# Patient Record
Sex: FEMALE | URBAN - METROPOLITAN AREA
[De-identification: names, ages, dates, MRNs, and addresses within clinical notes are randomized per-mention and may not be internally consistent; named-entity substitution may affect disease eponyms.]

---

## 2019-06-27 ENCOUNTER — APPOINTMENT (RX ONLY)
Dept: URBAN - METROPOLITAN AREA CLINIC 34 | Facility: CLINIC | Age: 39
Setting detail: DERMATOLOGY
End: 2019-06-27

## 2019-06-27 VITALS
DIASTOLIC BLOOD PRESSURE: 93 MMHG | WEIGHT: 233 LBS | SYSTOLIC BLOOD PRESSURE: 144 MMHG | HEIGHT: 60 IN | HEART RATE: 76 BPM

## 2019-06-27 DIAGNOSIS — L74.51 PRIMARY FOCAL HYPERHIDROSIS: ICD-10-CM

## 2019-06-27 DIAGNOSIS — R03.0 ELEVATED BLOOD-PRESSURE READING, WITHOUT DIAGNOSIS OF HYPERTENSION: ICD-10-CM

## 2019-06-27 PROBLEM — D23.71 OTHER BENIGN NEOPLASM OF SKIN OF RIGHT LOWER LIMB, INCLUDING HIP: Status: ACTIVE | Noted: 2019-06-27

## 2019-06-27 PROBLEM — L74.510 PRIMARY FOCAL HYPERHIDROSIS, AXILLA: Status: ACTIVE | Noted: 2019-06-27

## 2019-06-27 PROBLEM — F41.9 ANXIETY DISORDER, UNSPECIFIED: Status: ACTIVE | Noted: 2019-06-27

## 2019-06-27 PROCEDURE — ? PLAN FOR BMI MANAGEMENT

## 2019-06-27 PROCEDURE — ? PRESCRIPTION

## 2019-06-27 PROCEDURE — 99202 OFFICE O/P NEW SF 15 MIN: CPT

## 2019-06-27 PROCEDURE — ? OBSERVATION AND MEASURE

## 2019-06-27 PROCEDURE — ? COUNSELING

## 2019-06-27 RX ORDER — GLYCOPYRRONIUM 2.4 G/100G
1 CLOTH CLOTH TOPICAL EVERY 24 HOURS
Qty: 1 | Refills: 0 | Status: CANCELLED

## 2019-06-27 ASSESSMENT — LOCATION SIMPLE DESCRIPTION DERM
LOCATION SIMPLE: LEFT AXILLARY VAULT
LOCATION SIMPLE: RIGHT AXILLARY VAULT

## 2019-06-27 ASSESSMENT — LOCATION DETAILED DESCRIPTION DERM
LOCATION DETAILED: RIGHT AXILLARY VAULT
LOCATION DETAILED: LEFT AXILLARY VAULT

## 2019-06-27 ASSESSMENT — LOCATION ZONE DERM: LOCATION ZONE: AXILLAE

## 2019-06-27 NOTE — HPI: SKIN LESION
Is This A New Presentation, Or A Follow-Up?: Skin Lesion
What Type Of Note Output Would You Prefer (Optional)?: Bullet Format
Has Your Skin Lesion Been Treated?: not been treated
Which Family Member (Optional)?: Grandmother

## 2019-06-27 NOTE — PROCEDURE: COUNSELING
Patient Specific Counseling (Will Not Stick From Patient To Patient): Patient is very wet throughout the clothing and armpits on the skin.  She has tried over the counter Certain Dri and other Super strength antiperspirant and she erupts \"terribly\" on the skin and it becomes sensitive Drysol would not be a good option because of this- likely more problems, due to her history than benefit. She is interested in Qbrexxa.  Samples provided to her and reviewed appropriate use.  She is aware insurance may not cover and we may have to get through another company other than private insurance.  Advised of risks and potential adverse effects.  \\n\\nShe notes her hands and feet are not as bad as the armpits - which have been very involved since High School.  Referred her to also read online at [email or web address]  She will use samples and return in 2 weeks and we will review again - and s ee if she wishes to have script.  \\n\\nHyperhidrosis is the name given to excessive sweating.\\nHyperhidrosis may affect the entire body, or it may be localised to the armpits, palms, soles, face or elsewhere. It usually involves both sides of the body. The exact cause of hyperhidrosis is unknown. There are numerous sweat glands distributed over the body. The amount of sweat produced is regulated by a body temperature centre in the brain. An increase in air temperature, exercise, fever, anxiety, or spicy food may set off attacks of sweating. The sweating usually reduces at night time and disappears during sleep. Sweaty feet develop an unpleasant smell, ruin footwear and are prone to dermatitis and infection.\\n\\nTreatment:\\nWear loose fitting garments made of fabric that does not stain\\nExpect to change your socks several times a day. \\nAbsorbent insoles in shoes are useful, but may need replacing frequently. Don't wear the same pair of shoes two days in a row.\\nFrequent washing using a deodorizing soap can reduce the smell.\\nApply Drysol after a cool showed just before bed.\\nWash off in the morning. \\nApply the Drysol nightly for the first week.\\nAfter the first week apply 1-2 times a week.\\nTalcum powder and corn starch powder are suitable for mild cases.\\nApply the powder between the toes, under the breasts and skin folds.\\n\\nIf these treatments fail other options include:\\nAnticholinergic drugs, and Beta blockers.\\nIontophoresis is a device that can be purchased. A low electrical current is passed across the skin surface daily for several weeks and stops sweat production.\\nBotulinum toxin injections into the armpits can reduce or even stop sweating for three to six months. Botulinum toxin is also effective for the palms and soles. \\nOveractive sweat glands may also be removed by tumescent liposuction or subcutaneous curettage.\\nSurgery to remove the sweat gland-bearing skin of the armpits is a major procedure usually requiring skin grafts. \\nChemical or surgical sympathectomy. \\n\\nTreatment available for Hyperhydrosis \\n\\nBeta blockers is a medication used for heart disease and hypertension. It has been shown to be helpful in hyperhidrosis.\\n\\nIontophoresis is available at some hospital departments for hyperhidrosis of palms, soles and armpits. A low electrical current is passed across the skin surface daily for several weeks and stops sweat production. Visits are then required weekly or less often. Iontophoresis may cause irritation or dermatitis.\\n\\nBotulinum toxin injections in the armpits are increasingly popular  injections into the armpits are increasingly popular as they can reduce or even stop sweating for three to six months. Botulinum toxin is also effective for the palms and soles. Botulinum toxin may temporarily weaken the small muscles of the hands. Regional or local anesthetics is desirable in these sites as the injections are painful.\\n\\nSurgical excision of overactive sweat glands may also be removed by tumescent liposuction  or subcutaneous curettage. Surgery to remove the sweat gland-bearing skin of the armpits is a major procedure usually requiring skin grafts.\\n\\nChemical or surgical sympathectomy are operations on the spinal sympathetic nerves, which considerably reduce sweating of the armpits and the palms. Unfortunately the effect may not be permanent (recurring in up to 15% of cases), and is accompanied by undesirable skin warmth and dryness. There is a low risk of serious complications. Sympathectomy is therefore reserved for the most severely affected individuals. \\n
Medical Necessity Clause: Botulinum toxin hyperhidrosis therapy is medically necessary because
Detail Level: Detailed
Detail Level: Simple
Medical Necessity Information: LCD Guidelines vary from payer to payer. Please check with your payer's policy to determine medical necessity.
Patient Specific Counseling (Will Not Stick From Patient To Patient): Features support Dermatofibroma . Discussed removal as it is tender for her- and advised she talk with [facility] - for quote as she has not yet met her deductible She is aware of pathology costs separate from the procedural costs and billed by another company . She voiced understanding and is also aware that if something peculiar was found on pathology that costs could increase.  Do not expect that by exam today . \\n\\nIf she wishes to have removal it would be a surgical appointment with .
Size Of Lesion: 0.7 cm
Quality 317: Preventative Care And Screening: Screening For High Blood Pressure And Follow-Up Documented: Pre-hypertensive or hypertensive blood pressure reading documented, and the indicated follow-up is documented

## 2019-08-13 ENCOUNTER — APPOINTMENT (RX ONLY)
Dept: URBAN - METROPOLITAN AREA CLINIC 34 | Facility: CLINIC | Age: 39
Setting detail: DERMATOLOGY
End: 2019-08-13

## 2019-08-13 VITALS
WEIGHT: 225 LBS | HEART RATE: 77 BPM | DIASTOLIC BLOOD PRESSURE: 93 MMHG | HEIGHT: 60 IN | SYSTOLIC BLOOD PRESSURE: 138 MMHG

## 2019-08-13 DIAGNOSIS — R03.0 ELEVATED BLOOD-PRESSURE READING, WITHOUT DIAGNOSIS OF HYPERTENSION: ICD-10-CM

## 2019-08-13 DIAGNOSIS — L74.51 PRIMARY FOCAL HYPERHIDROSIS: ICD-10-CM

## 2019-08-13 PROBLEM — L74.510 PRIMARY FOCAL HYPERHIDROSIS, AXILLA: Status: ACTIVE | Noted: 2019-08-13

## 2019-08-13 PROCEDURE — ? COUNSELING

## 2019-08-13 PROCEDURE — ? PLAN FOR BMI MANAGEMENT

## 2019-08-13 PROCEDURE — ? PRESCRIPTION

## 2019-08-13 PROCEDURE — 99213 OFFICE O/P EST LOW 20 MIN: CPT

## 2019-08-13 RX ORDER — GLYCOPYRRONIUM 2.4 G/100G
1 TOWEL CLOTH TOPICAL EVERY 24 HOURS
Qty: 90 | Refills: 1 | Status: ERX | COMMUNITY
Start: 2019-08-13

## 2019-08-13 RX ADMIN — GLYCOPYRRONIUM 1 TOWEL: 2.4 CLOTH TOPICAL at 16:25

## 2019-08-13 ASSESSMENT — LOCATION SIMPLE DESCRIPTION DERM
LOCATION SIMPLE: RIGHT AXILLARY VAULT
LOCATION SIMPLE: LEFT AXILLARY VAULT

## 2019-08-13 ASSESSMENT — LOCATION DETAILED DESCRIPTION DERM
LOCATION DETAILED: LEFT AXILLARY VAULT
LOCATION DETAILED: RIGHT AXILLARY VAULT

## 2019-08-13 ASSESSMENT — LOCATION ZONE DERM: LOCATION ZONE: AXILLAE

## 2019-08-13 NOTE — PROCEDURE: COUNSELING
Medical Necessity Clause: Botulinum toxin hyperhidrosis therapy is medically necessary because
Detail Level: Detailed
Patient Specific Counseling (Will Not Stick From Patient To Patient): Patient has been using Qbrexa samples and found that she can cut them in 1/2 and get 2 doses out of them and it manages things well for her . She has been out now about a week- from the samples and states that she has not noted any adverse effects on review- no irritation or dryness.  She has tried all the aluminum and deoderant/antiperspirant products on the market . The higher up in strength she went with those the more irritation and trauma to the skin .Certain Dri was not doable as it really inflamed her skin causing much irritation and discomfort. Qbrexxa is not doing that for her and she does like how it works.\\n\\nReviewed measures in managing sweat and odor control We talked about script and will send to [facility]- and if concerns she is to call the clinic.  Coupon card given to patient she can activate and notify [facility]- of that whens he does.  \\n\\nReturn to clinic in 6 months or earlier if concerns. Have to see at least annually after that to maintain script .
Medical Necessity Information: LCD Guidelines vary from payer to payer. Please check with your payer's policy to determine medical necessity.
Quality 317: Preventative Care And Screening: Screening For High Blood Pressure And Follow-Up Documented: Pre-hypertensive or hypertensive blood pressure reading documented, and the indicated follow-up is documented

## 2019-09-26 ENCOUNTER — APPOINTMENT (RX ONLY)
Dept: URBAN - METROPOLITAN AREA CLINIC 34 | Facility: CLINIC | Age: 39
Setting detail: DERMATOLOGY
End: 2019-09-26

## 2019-09-26 VITALS
HEIGHT: 60 IN | DIASTOLIC BLOOD PRESSURE: 81 MMHG | SYSTOLIC BLOOD PRESSURE: 120 MMHG | WEIGHT: 221 LBS | HEART RATE: 88 BPM

## 2019-09-26 DIAGNOSIS — R21 RASH AND OTHER NONSPECIFIC SKIN ERUPTION: ICD-10-CM

## 2019-09-26 DIAGNOSIS — R03.0 ELEVATED BLOOD-PRESSURE READING, WITHOUT DIAGNOSIS OF HYPERTENSION: ICD-10-CM

## 2019-09-26 DIAGNOSIS — L85.3 XEROSIS CUTIS: ICD-10-CM

## 2019-09-26 PROCEDURE — 99213 OFFICE O/P EST LOW 20 MIN: CPT

## 2019-09-26 PROCEDURE — ? PLAN FOR BMI MANAGEMENT

## 2019-09-26 PROCEDURE — ? PRESCRIPTION

## 2019-09-26 PROCEDURE — ? COUNSELING

## 2019-09-26 RX ORDER — CLOBETASOL PROPIONATE 0.5 MG/ML
THIN LAYER SOLUTION TOPICAL BID-TID
Qty: 50 | Refills: 1 | Status: ERX | COMMUNITY
Start: 2019-09-26

## 2019-09-26 RX ADMIN — CLOBETASOL PROPIONATE THIN LAYER: 0.5 SOLUTION TOPICAL at 17:54

## 2019-09-26 ASSESSMENT — LOCATION DETAILED DESCRIPTION DERM
LOCATION DETAILED: RIGHT ANTERIOR DISTAL THIGH
LOCATION DETAILED: LEFT ANTERIOR PROXIMAL THIGH
LOCATION DETAILED: RIGHT PROXIMAL PRETIBIAL REGION
LOCATION DETAILED: RIGHT DISTAL LATERAL POSTERIOR UPPER ARM
LOCATION DETAILED: LEFT ANTERIOR DISTAL THIGH
LOCATION DETAILED: LEFT PROXIMAL DORSAL FOREARM
LOCATION DETAILED: LEFT PROXIMAL PRETIBIAL REGION
LOCATION DETAILED: LEFT DISTAL PRETIBIAL REGION
LOCATION DETAILED: RIGHT ANTERIOR PROXIMAL THIGH
LOCATION DETAILED: RIGHT PROXIMAL POSTERIOR UPPER ARM
LOCATION DETAILED: LEFT DISTAL POSTERIOR UPPER ARM
LOCATION DETAILED: RIGHT PROXIMAL DORSAL FOREARM
LOCATION DETAILED: LEFT PROXIMAL POSTERIOR UPPER ARM

## 2019-09-26 ASSESSMENT — LOCATION SIMPLE DESCRIPTION DERM
LOCATION SIMPLE: RIGHT FOREARM
LOCATION SIMPLE: RIGHT PRETIBIAL REGION
LOCATION SIMPLE: LEFT FOREARM
LOCATION SIMPLE: LEFT PRETIBIAL REGION
LOCATION SIMPLE: RIGHT POSTERIOR UPPER ARM
LOCATION SIMPLE: RIGHT THIGH
LOCATION SIMPLE: LEFT POSTERIOR UPPER ARM
LOCATION SIMPLE: LEFT THIGH

## 2019-09-26 ASSESSMENT — LOCATION ZONE DERM
LOCATION ZONE: ARM
LOCATION ZONE: LEG

## 2019-09-26 NOTE — PROCEDURE: COUNSELING
Detail Level: Detailed
Patient Specific Counseling (Will Not Stick From Patient To Patient): Patient has a history of eczema along with allergies - also notes family history of these things along with asthma.  She had problems as a child . She notes she erupted with incredible itch - tried 1 dose Claritin and 1 dose benadryl but \"it didn't help\" . She has used topical cortisone with no results - and she is scratching and not moisturizing . She finds hot baths helpful- in relieving the itch momentarily.  \\n\\nAdvised to the patient highly suspicious she may have atopic dermatitis.  We discussed Atopic dermatitis is a chronic, itchy skin condition that is very common in children but may occur at any age. It is also known as eczema and atopic eczema. Atopic dermatitis usually occurs in people who have an 'atopic tendency'. This means they may develop any or all of three closely linked conditions; atopic dermatitis, asthma and hay fever. Often these conditions run within families with a parent, child or sibling also affected. A family history of asthma, eczema or hay fever is particularly useful in diagnosing atopic dermatitis in infants. Atopic dermatitis is not contagious! It arises because of a complex interaction of genetic and environmental factors. These include skin irritants, the weather, temperature and non-specific triggers: Treatments include avoiding the environmental triggers, moisturizing, topical steroids or calcinurian inhibitors, oral steroids and light therapy. There are more intense treatments for severe cases.\\n\\Luisana this time suggest that we start by improving texture and tone of skin and not scratching or doing things that release more histamine Advised 1 dose of something will likely not manage or resolve this for her and she must moisturize.  \\n\\nShe tolerates that Claritin and also Benadryl makes her sleep- suggest she continue these - daily- taking Benadryl about 12 hours before getting up - do not drive after taking.  Also suggest she add an H2- blocker- Zantac 2 x daily-.  Continue this until her next visit.\\n\\nWe will also treat with Medicated CeraVe program.\\n\\n1.  PURCHASE 2 jars (16 oz) of CeraVe cream. This is often found at your pharmacy store.  If you have trouble findings it ask your pharmacist.\\n2.   the prescription of Clobetasol Solution from your pharmacy.\\n3.  When you get home - take a marker to ONE OF THE JARS_  marking the label that says medicated and date the container. MIX ALL of the Clobetasol Solution into one jar of the CeraVe' cream and stir with a butter knife or another utensil - mix well. \\n4.  Apply the Medicated Cream to areas that bother you the most or the most irritated at least 2 - 3 x daily to the problem areas - (really good to do one of the times right after bathing!) \\n5.  THE  PLAIN NON-MEDICATED CeraVe cream needs to be applied neck to toes at least 1 x DAILY -go right over the areas where you used the medicated cream.  YOU MAY REAPPLY THE PLAIN CREAM AS MANY TIMES AS YOU WISH THROUGHOUT THE DAY - BUT HAS TO BE AT LEAST 1 X DAILY NECK TO TOES.\\n\\nGOAL:  To DECREASE and ELIMINATE THE MEDICATED CREAM and daily generous use of PLAIN non-medicated CERAVE CREAM.\\n\\nApplication challenges:\\n1.  If having difficulty reaching an area - options:\\n     *Small foam paint roller\\n      *Back/flat side of back brush covere with       saran or a small plastic bag\\n      *Long strip of vinyl from fabric store cast offs (smear and rub on back)\\n      *Long handled spatula\\n\\nSuggestion to help with itch:  \\n1.  Wet wash cloths- put in the freezer- to become frozen and then applied to an area of itch and allowed to thaw.   A bag of frozen peas covered with a dishcloth for 10-15 minutes will also do much the same thing - this will stimulate the nerves without damaging the skin.  \\n2.  May use CeraVe ITCH cream found over the counter of Sarna cream - for itch relief - these DO NOT REPLACE moisturizing with the plain cream.  \\n3.  Scratching the skin does not repair the skin - it feeds the problem of itch (releases more histamine) - as well as showers and baths that are long and hot. \\n\\n\\nWe will compare findings and discuss if she would really like allergist referral - which she wondered about today.  The key is in the moisturizing - encouraged generously and daily.  Protopic/Elidel and/or Eucrisa could be other considerations.
Quality 317: Preventative Care And Screening: Screening For High Blood Pressure And Follow-Up Documented: Pre-hypertensive or hypertensive blood pressure reading documented, and the indicated follow-up is documented

## 2019-09-26 NOTE — HPI: RASH
What Type Of Note Output Would You Prefer (Optional)?: Bullet Format
How Severe Is Your Rash?: moderate
Is This A New Presentation, Or A Follow-Up?: Rash
Additional History: Patient has been using Benadryl. \\nPatient shares that she has had a history of eczema and allergies as a child personal and family history of eczema/allergies and asthma.  She notes she has had this for a few days- tried 1 dose Claritin and Benadryl and didn't help and used a little topical over the counter steroid which didn't help. She is not moisturizing. She is scratching and finds that hot baths and showers help the itch for awhile.